# Patient Record
Sex: FEMALE | Employment: OTHER | URBAN - METROPOLITAN AREA
[De-identification: names, ages, dates, MRNs, and addresses within clinical notes are randomized per-mention and may not be internally consistent; named-entity substitution may affect disease eponyms.]

---

## 2023-02-02 ENCOUNTER — OFFICE VISIT (OUTPATIENT)
Dept: ORTHOPEDIC SURGERY | Age: 73
End: 2023-02-02
Payer: MEDICARE

## 2023-02-02 VITALS — BODY MASS INDEX: 23.19 KG/M2 | WEIGHT: 153 LBS | HEIGHT: 68 IN

## 2023-02-02 DIAGNOSIS — M17.12 PRIMARY OSTEOARTHRITIS OF LEFT KNEE: Primary | ICD-10-CM

## 2023-02-02 RX ORDER — DICLOFENAC SODIUM 75 MG/1
75 TABLET, DELAYED RELEASE ORAL 2 TIMES DAILY
Qty: 60 TABLET | Refills: 1 | Status: SHIPPED | OUTPATIENT
Start: 2023-02-02

## 2023-02-02 RX ORDER — ATORVASTATIN CALCIUM 20 MG/1
20 TABLET, FILM COATED ORAL DAILY
COMMUNITY
Start: 2022-11-28

## 2023-02-02 RX ORDER — LOSARTAN POTASSIUM AND HYDROCHLOROTHIAZIDE 12.5; 5 MG/1; MG/1
1 TABLET ORAL DAILY
COMMUNITY
Start: 2022-11-28

## 2023-02-02 RX ORDER — VERAPAMIL HYDROCHLORIDE 300 MG/1
300 CAPSULE, EXTENDED RELEASE ORAL DAILY
COMMUNITY
Start: 2022-11-28

## 2023-02-02 NOTE — PROGRESS NOTES
Zeina Torrez (: 1950) is a 81 y.o. female, patient, here for evaluation of the following chief complaint(s):  Knee Pain (Left/)       SUBJECTIVE/OBJECTIVE:  Zeina Torrez presents today for evaluation of left knee pain. Today she is actually pain-free. However she has had waxing and waning symptoms, pain on and off, for approximately 6 months. When flared up, discomfort is in the anterior medial knee. Can be significant with weightbearing activities. Occasionally has aching that keeps her from falling asleep, may awaken her at night. She has periods of time with little if any discomfort. Denies mechanical symptoms. She was having subjective tightness in the left knee, which has significantly improved after just a few therapy sessions, which she continues. She uses over-the-counter anti-inflammatories intermittently as needed. She avoids high impact activities but exercises regularly. PHYSICAL EXAM:  Vitals: Ht 5' 8\" (1.727 m)   Wt 153 lb (69.4 kg)   BMI 23.26 kg/m²   Body mass index is 23.26 kg/m². 81y.o. year old F, no distress. Ambulates without a limp. Pain-free motion left hip. Negative Stinchfield. Well-preserved pain-free left hip range of motion. Left knee is in neutral alignment. No warmth swelling or effusion. No joint line tenderness. Full active and passive knee extension with flexion limited to approximately 120 degrees with posterior tightness. Patella tracks centrally. No instability. Negative Wong's. Symmetrical palpable distal pulses. No gross motor or sensory deficits in lower extremities. No distal edema. IMAGING:  Radiographs: XR Results (most recent):  Results from Appointment encounter on 23    XR KNEE LT MIN 4 V    Narrative  4 x-ray views left knee including AP and PA flexion, lateral, sunrise demonstrate severe medial compartment osteoarthritis with complete loss of medial joint space on weightbearing PA flexion view. Moderate loss on AP image. Mild loss patellofemoral space. Small marginal osteophytes present in the medial and patellofemoral compartments. ASSESSMENT/PLAN:  1. Primary osteoarthritis of left knee  -     XR KNEE LT MIN 4 V; Future  -     diclofenac EC (VOLTAREN) 75 mg EC tablet; Take 1 Tablet by mouth two (2) times a day., Normal, Disp-60 Tablet, R-1    Severe medial compartment osteoarthritis with mild to moderate symptoms. Discussed continued comprehensive conservative treatment measures, with possible need for partial or total knee replacement at some point in the future. We will try prescription nonsteroidal anti-inflammatory for short time, then intermittently as needed. If she ends up needing that long-term, I will ask her primary care physician to assume management for appropriate long-term monitoring. Quad strengthening for the long-term, avoid high impact exercise. She will return as needed. No follow-ups on file. Review Of Systems  ROS    Positive for: Musculoskeletal  Last edited by Aruna Callaway on 2/2/2023  2:44 PM.         Patient denies any recent fever, chills, nausea, vomiting, chest pain, or shortness of breath. No Known Allergies    Current Outpatient Medications   Medication Sig    losartan-hydroCHLOROthiazide (HYZAAR) 50-12.5 mg per tablet Take 1 Tablet by mouth daily. verapamil ER (VERELAN PM) 300 mg capsule Take 300 mg by mouth daily. atorvastatin (LIPITOR) 20 mg tablet Take 20 mg by mouth daily. diclofenac EC (VOLTAREN) 75 mg EC tablet Take 1 Tablet by mouth two (2) times a day. No current facility-administered medications for this visit. Past Medical History:   Diagnosis Date    Cancer ([address]     breast    High cholesterol     Hypertension         Past Surgical History:   Procedure Laterality Date    HX BREAST LUMPECTOMY Right     IR CHOLECYSTOSTOMY PERCUTANEOUS         History reviewed. No pertinent family history. Social History     Socioeconomic History    Marital status:      Spouse name: Not on file    Number of children: Not on file    Years of education: Not on file    Highest education level: Not on file   Occupational History    Not on file   Tobacco Use    Smoking status: Never    Smokeless tobacco: Never   Vaping Use    Vaping Use: Never used   Substance and Sexual Activity    Alcohol use: Not Currently    Drug use: Never    Sexual activity: Not on file   Other Topics Concern    Not on file   Social History Narrative    Not on file     Social Determinants of Health     Financial Resource Strain: Not on file   Food Insecurity: Not on file   Transportation Needs: Not on file   Physical Activity: Not on file   Stress: Not on file   Social Connections: Not on file   Intimate Partner Violence: Not on file   Housing Stability: Not on file       Orders Placed This Encounter    XR KNEE LT MIN 4 V     Standing Status:   Future     Number of Occurrences:   1     Standing Expiration Date:   2/3/2024    diclofenac EC (VOLTAREN) 75 mg EC tablet     Sig: Take 1 Tablet by mouth two (2) times a day. Dispense:  60 Tablet     Refill:  1        An electronic signature was used to authenticate this note.   -- Meghna Davison MD

## 2023-02-02 NOTE — LETTER
2/2/2023    Patient: Katie Shoulder   YOB: 1950   Date of Visit: 2/2/2023     Jojo Courtney MD  [address] [facility] [address]  Via Fax: [phone]    Dear Jojo Courtney MD,      Thank you for referring Ms. Katie Cruz to [facility] for evaluation. My notes for this consultation are attached. If you have questions, please do not hesitate to call me. I look forward to following your patient along with you.       Sincerely,    Carroll Garcia MD

## 2023-04-04 RX ORDER — DICLOFENAC SODIUM 75 MG/1
TABLET, DELAYED RELEASE ORAL
Qty: 60 TABLET | Refills: 1
Start: 2023-04-04